# Patient Record
Sex: MALE | Race: WHITE
[De-identification: names, ages, dates, MRNs, and addresses within clinical notes are randomized per-mention and may not be internally consistent; named-entity substitution may affect disease eponyms.]

---

## 2019-11-10 ENCOUNTER — HOSPITAL ENCOUNTER (INPATIENT)
Dept: HOSPITAL 72 - EMR | Age: 74
LOS: 3 days | Discharge: ANOTHER HOSPITAL | DRG: 641 | End: 2019-11-13
Payer: MEDICARE

## 2019-11-10 VITALS — HEIGHT: 73 IN | BODY MASS INDEX: 24.68 KG/M2 | WEIGHT: 186.25 LBS

## 2019-11-10 VITALS — SYSTOLIC BLOOD PRESSURE: 108 MMHG | DIASTOLIC BLOOD PRESSURE: 58 MMHG

## 2019-11-10 VITALS — SYSTOLIC BLOOD PRESSURE: 111 MMHG | DIASTOLIC BLOOD PRESSURE: 56 MMHG

## 2019-11-10 VITALS — SYSTOLIC BLOOD PRESSURE: 120 MMHG | DIASTOLIC BLOOD PRESSURE: 71 MMHG

## 2019-11-10 DIAGNOSIS — E86.1: ICD-10-CM

## 2019-11-10 DIAGNOSIS — F32.9: ICD-10-CM

## 2019-11-10 DIAGNOSIS — Z79.82: ICD-10-CM

## 2019-11-10 DIAGNOSIS — I10: ICD-10-CM

## 2019-11-10 DIAGNOSIS — G20: ICD-10-CM

## 2019-11-10 DIAGNOSIS — F02.80: ICD-10-CM

## 2019-11-10 DIAGNOSIS — E78.5: ICD-10-CM

## 2019-11-10 DIAGNOSIS — E87.3: ICD-10-CM

## 2019-11-10 DIAGNOSIS — E86.0: Primary | ICD-10-CM

## 2019-11-10 DIAGNOSIS — R55: ICD-10-CM

## 2019-11-10 DIAGNOSIS — F20.0: ICD-10-CM

## 2019-11-10 DIAGNOSIS — K21.9: ICD-10-CM

## 2019-11-10 DIAGNOSIS — D64.9: ICD-10-CM

## 2019-11-10 LAB
ADD MANUAL DIFF: NO
ALBUMIN SERPL-MCNC: 3.3 G/DL (ref 3.4–5)
ALBUMIN/GLOB SERPL: 1.1 {RATIO} (ref 1–2.7)
ALP SERPL-CCNC: 66 U/L (ref 46–116)
ALT SERPL-CCNC: 12 U/L (ref 12–78)
AMYLASE SERPL-CCNC: 34 U/L (ref 25–115)
ANION GAP SERPL CALC-SCNC: 6 MMOL/L (ref 5–15)
APPEARANCE UR: (no result)
APTT PPP: YELLOW S
AST SERPL-CCNC: 19 U/L (ref 15–37)
BASOPHILS NFR BLD AUTO: 0.3 % (ref 0–2)
BILIRUB SERPL-MCNC: 0.9 MG/DL (ref 0.2–1)
BUN SERPL-MCNC: 15 MG/DL (ref 7–18)
CALCIUM SERPL-MCNC: 8.2 MG/DL (ref 8.5–10.1)
CHLORIDE SERPL-SCNC: 106 MMOL/L (ref 98–107)
CK MB SERPL-MCNC: 3.4 NG/ML (ref 0–3.6)
CK SERPL-CCNC: 125 U/L (ref 26–308)
CO2 SERPL-SCNC: 29 MMOL/L (ref 21–32)
CREAT SERPL-MCNC: 0.8 MG/DL (ref 0.55–1.3)
EOSINOPHIL NFR BLD AUTO: 0.4 % (ref 0–3)
ERYTHROCYTE [DISTWIDTH] IN BLOOD BY AUTOMATED COUNT: 11.8 % (ref 11.6–14.8)
GLOBULIN SER-MCNC: 2.9 G/DL
GLUCOSE UR STRIP-MCNC: NEGATIVE MG/DL
HCT VFR BLD CALC: 41.1 % (ref 42–52)
HGB BLD-MCNC: 13.8 G/DL (ref 14.2–18)
KETONES UR QL STRIP: (no result)
LEUKOCYTE ESTERASE UR QL STRIP: NEGATIVE
LYMPHOCYTES NFR BLD AUTO: 19.7 % (ref 20–45)
MCV RBC AUTO: 94 FL (ref 80–99)
MONOCYTES NFR BLD AUTO: 6.9 % (ref 1–10)
NEUTROPHILS NFR BLD AUTO: 72.8 % (ref 45–75)
NITRITE UR QL STRIP: NEGATIVE
PH UR STRIP: 6.5 [PH] (ref 4.5–8)
PHOSPHATE SERPL-MCNC: 4.4 MG/DL (ref 2.5–4.9)
PLATELET # BLD: 226 K/UL (ref 150–450)
POTASSIUM SERPL-SCNC: 4.1 MMOL/L (ref 3.5–5.1)
PROT UR QL STRIP: (no result)
RBC # BLD AUTO: 4.36 M/UL (ref 4.7–6.1)
SODIUM SERPL-SCNC: 141 MMOL/L (ref 136–145)
SP GR UR STRIP: 1.01 (ref 1–1.03)
UROBILINOGEN UR-MCNC: 4 MG/DL (ref 0–1)
WBC # BLD AUTO: 5.8 K/UL (ref 4.8–10.8)

## 2019-11-10 PROCEDURE — 85025 COMPLETE CBC W/AUTO DIFF WBC: CPT

## 2019-11-10 PROCEDURE — 83690 ASSAY OF LIPASE: CPT

## 2019-11-10 PROCEDURE — 99285 EMERGENCY DEPT VISIT HI MDM: CPT

## 2019-11-10 PROCEDURE — 83880 ASSAY OF NATRIURETIC PEPTIDE: CPT

## 2019-11-10 PROCEDURE — 96360 HYDRATION IV INFUSION INIT: CPT

## 2019-11-10 PROCEDURE — 93005 ELECTROCARDIOGRAM TRACING: CPT

## 2019-11-10 PROCEDURE — 84484 ASSAY OF TROPONIN QUANT: CPT

## 2019-11-10 PROCEDURE — 87040 BLOOD CULTURE FOR BACTERIA: CPT

## 2019-11-10 PROCEDURE — 82553 CREATINE MB FRACTION: CPT

## 2019-11-10 PROCEDURE — 90689 VACC IIV4 NO PRSRV 0.25ML IM: CPT

## 2019-11-10 PROCEDURE — 80053 COMPREHEN METABOLIC PANEL: CPT

## 2019-11-10 PROCEDURE — 81003 URINALYSIS AUTO W/O SCOPE: CPT

## 2019-11-10 PROCEDURE — 70450 CT HEAD/BRAIN W/O DYE: CPT

## 2019-11-10 PROCEDURE — 90732 PPSV23 VACC 2 YRS+ SUBQ/IM: CPT

## 2019-11-10 PROCEDURE — 83735 ASSAY OF MAGNESIUM: CPT

## 2019-11-10 PROCEDURE — 83605 ASSAY OF LACTIC ACID: CPT

## 2019-11-10 PROCEDURE — 87181 SC STD AGAR DILUTION PER AGT: CPT

## 2019-11-10 PROCEDURE — 82550 ASSAY OF CK (CPK): CPT

## 2019-11-10 PROCEDURE — 71045 X-RAY EXAM CHEST 1 VIEW: CPT

## 2019-11-10 PROCEDURE — 87081 CULTURE SCREEN ONLY: CPT

## 2019-11-10 PROCEDURE — 84100 ASSAY OF PHOSPHORUS: CPT

## 2019-11-10 PROCEDURE — 80048 BASIC METABOLIC PNL TOTAL CA: CPT

## 2019-11-10 PROCEDURE — 82150 ASSAY OF AMYLASE: CPT

## 2019-11-10 PROCEDURE — 36415 COLL VENOUS BLD VENIPUNCTURE: CPT

## 2019-11-10 PROCEDURE — 82962 GLUCOSE BLOOD TEST: CPT

## 2019-11-10 RX ADMIN — HEPARIN SODIUM SCH UNITS: 5000 INJECTION INTRAVENOUS; SUBCUTANEOUS at 20:49

## 2019-11-10 RX ADMIN — DEXTROSE AND SODIUM CHLORIDE SCH MLS/HR: 5; .45 INJECTION, SOLUTION INTRAVENOUS at 17:47

## 2019-11-10 RX ADMIN — ATORVASTATIN CALCIUM SCH MG: 20 TABLET, FILM COATED ORAL at 20:48

## 2019-11-10 NOTE — NUR
Pt [place] [facility]. Pt came with c/o of syncope episode witnessed 
by SNF staff earlier today. Pt AAO x3, vital signs stable, and respirations 
even and unlabored. No cardiac or respiratory distress noted. Patient placed on 
cardiac monitors

## 2019-11-10 NOTE — NUR
NURSE NOTES:

Patient was admitted from ED via gurney. AAO x 2 and confused. Room air. No s/s of 
distress/SOB. Stable vital sign 120/71,  96%. Sinus rhythm @ 85 on the heart monitor. IV on 
R hand 20g and L hand 18g intact and patent, with saline lock. Blanchable redness on 
buttocks and picture taken. Orientation on the new unit given. Belongings were accounted 
for. Bed in the lowest, locked, and alarm on. Call light within reach. Paged dr. Kerr for 
admission order and awaiting for the response. Will continue to monitor

## 2019-11-10 NOTE — EMERGENCY ROOM REPORT
History of Present Illness


General


Chief Complaint:  Syncope


Source:  Patient, Medical Record





Present Illness


HPI


Patient is 74-year-old male brought in by EMS after syncopal episode at his 

facility.  Patient had prior history of dementia and depression.  He was noted 

to have initially low blood pressure when checked by EMS.  He denies any prior 

cardiac history.  He denies prior syncopal episodes in the past.  History is 

limited by patient being a poor historian.


Allergies:  


Coded Allergies:  


     No Known Allergies (Unverified , 11/10/19)





Patient History


Past Medical History:  see triage record


Reviewed Nursing Documentation:  PMH: Agreed; PSxH: Agreed





Nursing Documentation-PMH


Hx Cardiac Problems:  No - Hyperlipidemia


Hx Hypertension:  Yes


History Of Psychiatric Problem:  Yes - Parkinson's, Dementia





Review of Systems


All Other Systems:  limited





Physical Exam





Vital Signs








  Date Time  Temp Pulse Resp B/P (MAP) Pulse Ox O2 Delivery O2 Flow Rate FiO2


 


11/10/19 12:21 98.1 66 15 112/56 (74) 99 Room Air  








Sp02 EP Interpretation:  reviewed, normal


General Appearance:  normal inspection, alert, Chronically Ill


Head:  atraumatic


ENT:  normal ENT inspection, normal voice, dry mucus membranes


Neck:  normal inspection, supple, no bony tend, limited range of motion


Respiratory:  normal inspection, no respiratory distress, no retraction, 

respiratory distress


Cardiovascular #1:  regular rate, rhythm, no edema


Gastrointestinal:  normal inspection, soft, no guarding, no hernia


Genitourinary:  no CVA tenderness


Musculoskeletal:  normal inspection, back normal, normal range of motion


Neurologic:  normal inspection, alert, responsive, speech normal, other - tremor


Psychiatric:  normal inspection, mood/affect normal





Medical Decision Making


Diagnostic Impression:  


 Primary Impression:  


 Syncope


 Additional Impression:  


 Head injury


ER Course


Patient presented for head injury after syncopal episode.  Differential 

diagnosis include was not limited to contusion, syncopal episode, seizure, 

sepsis among others.  Because of complexity of patient's case laboratory tests 

and imaging studies were ordered.  EKG interpreted by me showed normal sinus 

rhythm with a rate of 69 without acute ST or T wave changes.Patient's 

laboratory testing showed normal white blood count.  He was noted to have some 

difficulty with urination.Patient will be admitted for further monitoring and 

treatment due to recent syncopal episode and head injury.CT of head read by 

radiology showed no evidence of acute intracranial hemorrhage.Patient's 

laboratory testing was unremarkable.  Dr. Geraldo Kerr was contacted for 

inpatient management due to panel physician.





Labs








Test


  11/10/19


13:15 11/10/19


13:52


 


White Blood Count


  5.8 K/UL


(4.8-10.8) 


 


 


Red Blood Count


  4.36 M/UL


(4.70-6.10) 


 


 


Hemoglobin


  13.8 G/DL


(14.2-18.0) 


 


 


Hematocrit


  41.1 %


(42.0-52.0) 


 


 


Mean Corpuscular Volume 94 FL (80-99)  


 


Mean Corpuscular Hemoglobin


  31.6 PG


(27.0-31.0) 


 


 


Mean Corpuscular Hemoglobin


Concent 33.5 G/DL


(32.0-36.0) 


 


 


Red Cell Distribution Width


  11.8 %


(11.6-14.8) 


 


 


Platelet Count


  226 K/UL


(150-450) 


 


 


Mean Platelet Volume


  6.5 FL


(6.5-10.1) 


 


 


Neutrophils (%) (Auto)


  72.8 %


(45.0-75.0) 


 


 


Lymphocytes (%) (Auto)


  19.7 %


(20.0-45.0) 


 


 


Monocytes (%) (Auto)


  6.9 %


(1.0-10.0) 


 


 


Eosinophils (%) (Auto)


  0.4 %


(0.0-3.0) 


 


 


Basophils (%) (Auto)


  0.3 %


(0.0-2.0) 


 


 


Sodium Level


  141 MMOL/L


(136-145) 


 


 


Potassium Level


  4.1 MMOL/L


(3.5-5.1) 


 


 


Chloride Level


  106 MMOL/L


() 


 


 


Carbon Dioxide Level


  29 MMOL/L


(21-32) 


 


 


Anion Gap


  6 mmol/L


(5-15) 


 


 


Blood Urea Nitrogen


  15 mg/dL


(7-18) 


 


 


Creatinine


  0.8 MG/DL


(0.55-1.30) 


 


 


Estimat Glomerular Filtration


Rate  mL/min (>60) 


  


 


 


Glucose Level


  112 MG/DL


() 


 


 


Calcium Level


  8.2 MG/DL


(8.5-10.1) 


 


 


Troponin I


  0.000 ng/mL


(0.000-0.056) 


 








EKG Diagnostic Results


Rate:  normal - 69


Rhythm:  NSR


ST Segments:  no acute changes





Last Vital Signs








  Date Time  Temp Pulse Resp B/P (MAP) Pulse Ox O2 Delivery O2 Flow Rate FiO2


 


11/10/19 12:21 98.1 66 15 112/56 (74) 99 Room Air  








Status:  unchanged


Disposition:  ADMITTED AS INPATIENT


Condition:  Stable


Referrals:  


NON PHYSICIAN (PCP)











Ebenezer White MD Nov 10, 2019 13:58

## 2019-11-10 NOTE — DIAGNOSTIC IMAGING REPORT
Indication: Headache

 

Technique: Contiguous 5 mm thick transaxial imaging of the head obtained in a Siemens

Sensation 64 slice CT scanner.  Soft tissue and bone windows generated.  Automatic

Exposure Control was utilized.

 

 

Total Dose length Product (DLP):  1457 mGycm

 

CT Dose Index Volume (CTDIvol):   62.7 mGy

 

Comparison: none

 

Findings: There is mild prominence of the ventricles, basal cisterns, and cerebral

sulci consistent with atrophy. Mild, nonspecific, white matter hypoattenuation is

noted throughout the brain consistent with chronic small vessel disease. 

 

There is no midline shift, edema, acute hemorrhage, mass effect, or abnormal

extra-axial fluid collections. Bones are unremarkable.

 

Impression: No acute intracranial bleed, mass effect or edema.

 

Mild atrophy of the brain.

 

Nonspecific white matter hypoattenuation probably due to chronic small vessel

disease.

 

 

 

The CT scanner at [facility] is accredited by the American College of

Radiology and the scans are performed using dose optimization techniques as

appropriate to a performed exam including Automatic Exposure control.

## 2019-11-10 NOTE — NUR
NURSE NOTES:

 AAO x 2 confused. Room air. No s/s of distress/SOB.  IV on R hand 20g and L hand 18g intact 
and patent, saline lock. Blanchable redness on buttocks  Bed is in the lowest position, 
locked, and bed alarm on. Call light within reach. Asked pt to call when assistance is 
needed. Charting near patient's room as pt is very confused. Will continue to monitor and 
redirect.

## 2019-11-10 NOTE — DIAGNOSTIC IMAGING REPORT
Indication: Dyspnea

 

Comparison:  None

 

A single view chest radiograph was obtained.

 

Findings:

 

Cardiomediastinal appearance is within normal limits for age. The lungs are clear.

Pulmonary vascularity is appropriate. The diaphragmatic contour is smooth and

costophrenic angles are sharp. No pleural effusions are identified. The bones are

unremarkable. There is sclerosis and hypertrophy about the right glenohumeral joint

consistent with osteoarthritis.

 

Impression: No acute findings

## 2019-11-11 VITALS — SYSTOLIC BLOOD PRESSURE: 121 MMHG | DIASTOLIC BLOOD PRESSURE: 62 MMHG

## 2019-11-11 VITALS — SYSTOLIC BLOOD PRESSURE: 111 MMHG | DIASTOLIC BLOOD PRESSURE: 55 MMHG

## 2019-11-11 VITALS — SYSTOLIC BLOOD PRESSURE: 103 MMHG | DIASTOLIC BLOOD PRESSURE: 54 MMHG

## 2019-11-11 VITALS — SYSTOLIC BLOOD PRESSURE: 129 MMHG | DIASTOLIC BLOOD PRESSURE: 68 MMHG

## 2019-11-11 VITALS — SYSTOLIC BLOOD PRESSURE: 111 MMHG | DIASTOLIC BLOOD PRESSURE: 89 MMHG

## 2019-11-11 VITALS — DIASTOLIC BLOOD PRESSURE: 65 MMHG | SYSTOLIC BLOOD PRESSURE: 117 MMHG

## 2019-11-11 LAB
ADD MANUAL DIFF: NO
ANION GAP SERPL CALC-SCNC: 4 MMOL/L (ref 5–15)
BASOPHILS NFR BLD AUTO: 0.4 % (ref 0–2)
BUN SERPL-MCNC: 13 MG/DL (ref 7–18)
CALCIUM SERPL-MCNC: 8 MG/DL (ref 8.5–10.1)
CHLORIDE SERPL-SCNC: 107 MMOL/L (ref 98–107)
CO2 SERPL-SCNC: 28 MMOL/L (ref 21–32)
CREAT SERPL-MCNC: 0.9 MG/DL (ref 0.55–1.3)
EOSINOPHIL NFR BLD AUTO: 0.8 % (ref 0–3)
ERYTHROCYTE [DISTWIDTH] IN BLOOD BY AUTOMATED COUNT: 11.9 % (ref 11.6–14.8)
HCT VFR BLD CALC: 37.6 % (ref 42–52)
HGB BLD-MCNC: 12.6 G/DL (ref 14.2–18)
LYMPHOCYTES NFR BLD AUTO: 17.9 % (ref 20–45)
MCV RBC AUTO: 94 FL (ref 80–99)
MONOCYTES NFR BLD AUTO: 7.5 % (ref 1–10)
NEUTROPHILS NFR BLD AUTO: 73.5 % (ref 45–75)
PLATELET # BLD: 232 K/UL (ref 150–450)
POTASSIUM SERPL-SCNC: 4.3 MMOL/L (ref 3.5–5.1)
RBC # BLD AUTO: 4 M/UL (ref 4.7–6.1)
SODIUM SERPL-SCNC: 139 MMOL/L (ref 136–145)
WBC # BLD AUTO: 6.1 K/UL (ref 4.8–10.8)

## 2019-11-11 RX ADMIN — CARBIDOPA AND LEVODOPA SCH TAB: 25; 100 TABLET ORAL at 11:52

## 2019-11-11 RX ADMIN — DEXTROSE AND SODIUM CHLORIDE SCH MLS/HR: 5; .45 INJECTION, SOLUTION INTRAVENOUS at 09:59

## 2019-11-11 RX ADMIN — CARBIDOPA AND LEVODOPA SCH TAB: 25; 100 TABLET ORAL at 18:10

## 2019-11-11 RX ADMIN — HEPARIN SODIUM SCH UNITS: 5000 INJECTION INTRAVENOUS; SUBCUTANEOUS at 08:42

## 2019-11-11 RX ADMIN — DOCUSATE SODIUM SCH MG: 100 CAPSULE, LIQUID FILLED ORAL at 18:10

## 2019-11-11 RX ADMIN — HEPARIN SODIUM SCH UNITS: 5000 INJECTION INTRAVENOUS; SUBCUTANEOUS at 20:19

## 2019-11-11 RX ADMIN — PANTOPRAZOLE SODIUM SCH MG: 40 INJECTION, POWDER, FOR SOLUTION INTRAVENOUS at 08:41

## 2019-11-11 RX ADMIN — ASPIRIN SCH MG: 81 TABLET, DELAYED RELEASE ORAL at 08:41

## 2019-11-11 RX ADMIN — CITALOPRAM HYDROBROMIDE SCH MG: 20 TABLET, FILM COATED ORAL at 08:41

## 2019-11-11 RX ADMIN — DOCUSATE SODIUM SCH MG: 100 CAPSULE, LIQUID FILLED ORAL at 08:41

## 2019-11-11 RX ADMIN — CARBIDOPA AND LEVODOPA SCH TAB: 25; 100 TABLET ORAL at 06:33

## 2019-11-11 RX ADMIN — ATORVASTATIN CALCIUM SCH MG: 20 TABLET, FILM COATED ORAL at 20:17

## 2019-11-11 RX ADMIN — CARBIDOPA AND LEVODOPA SCH TAB: 25; 100 TABLET ORAL at 23:23

## 2019-11-11 RX ADMIN — CARBIDOPA AND LEVODOPA SCH TAB: 25; 100 TABLET ORAL at 00:14

## 2019-11-11 NOTE — CARDIOLOGY PROGRESS NOTE
Assessment/Plan


Assessment/Plan


The patient is seen and examined, full consult note will be dictated.





Objective





Last 24 Hour Vital Signs








  Date Time  Temp Pulse Resp B/P (MAP) Pulse Ox O2 Delivery O2 Flow Rate FiO2


 


11/11/19 16:00  82      


 


11/11/19 16:00 97.9 81 18 121/62 (81) 96   


 


11/11/19 12:00  90      


 


11/11/19 12:00 96.8 68 20 117/65 (82) 94   


 


11/11/19 08:42  87  129/68    


 


11/11/19 08:12      Room Air  


 


11/11/19 08:00 96.6 87 20 129/68 (88) 97   


 


11/11/19 08:00  85      


 


11/11/19 04:09  70      


 


11/11/19 04:00 98.1 94 16 111/55 (73) 94   


 


11/11/19 00:00 97.5 71 16 103/54 (70) 94   


 


11/11/19 00:00  76      


 


11/10/19 21:00      Room Air  


 


11/10/19 20:00 97.7 80 16 108/58 (75) 96   


 


11/10/19 20:00  79      

















Intake and Output  


 


 11/10/19 11/11/19





 18:59 06:59


 


Intake Total 1000 ml 0 ml


 


Output Total 1 ml 500 ml


 


Balance 999 ml -500 ml


 


  


 


Intake Oral 0 ml 0 ml


 


IV Total 1000 ml 


 


Output Urine Total 1 ml 500 ml


 


# Voids  3











Laboratory Tests








Test


  11/11/19


00:10 11/11/19


08:15


 


Troponin I


  0.007 ng/mL


(0.000-0.056) 0.000 ng/mL


(0.000-0.056)


 


White Blood Count


  


  6.1 K/UL


(4.8-10.8)


 


Red Blood Count


  


  4.00 M/UL


(4.70-6.10)  L


 


Hemoglobin


  


  12.6 G/DL


(14.2-18.0)  L


 


Hematocrit


  


  37.6 %


(42.0-52.0)  L


 


Mean Corpuscular Volume  94 FL (80-99)  


 


Mean Corpuscular Hemoglobin


  


  31.5 PG


(27.0-31.0)  H


 


Mean Corpuscular Hemoglobin


Concent 


  33.5 G/DL


(32.0-36.0)


 


Red Cell Distribution Width


  


  11.9 %


(11.6-14.8)


 


Platelet Count


  


  232 K/UL


(150-450)


 


Mean Platelet Volume


  


  6.0 FL


(6.5-10.1)  L


 


Neutrophils (%) (Auto)


  


  73.5 %


(45.0-75.0)


 


Lymphocytes (%) (Auto)


  


  17.9 %


(20.0-45.0)  L


 


Monocytes (%) (Auto)


  


  7.5 %


(1.0-10.0)


 


Eosinophils (%) (Auto)


  


  0.8 %


(0.0-3.0)


 


Basophils (%) (Auto)


  


  0.4 %


(0.0-2.0)


 


Sodium Level


  


  139 MMOL/L


(136-145)


 


Potassium Level


  


  4.3 MMOL/L


(3.5-5.1)


 


Chloride Level


  


  107 MMOL/L


()


 


Carbon Dioxide Level


  


  28 MMOL/L


(21-32)


 


Anion Gap


  


  4 mmol/L


(5-15)  L


 


Blood Urea Nitrogen


  


  13 mg/dL


(7-18)


 


Creatinine


  


  0.9 MG/DL


(0.55-1.30)


 


Estimat Glomerular Filtration


Rate 


   mL/min (>60)  


 


 


Glucose Level


  


  145 MG/DL


()  H


 


Calcium Level


  


  8.0 MG/DL


(8.5-10.1)  L

















Michael Harrison MD Nov 11, 2019 18:17

## 2019-11-11 NOTE — NUR
**: DISCHARGE PLANNING:



PATIENT HAS BEEN REFERRED TO [facility]

T: [phone]

F: [phone]



CLINICALS HAVE BEEN FAXED

WAITING FOR RESPONSE

## 2019-11-11 NOTE — NUR
NURSE NOTES:WOUND CARE NOTES:Pt presented on admission with non-blanching erythema sacrum 
without induration.

Thick callus plantar L foot. No erythema noted. Pt complained of pain to touch.  

Thick callus Plantar R foot. No erythema noted but pt complained of pain .

Both heels are dry and firm. No other skin concerns noted.



Tx.Plan: Apply Moisture Barrier Paste to buttocks. Cover sacrum with Optifoam drsg. Change 
every 3 days and prn.

            Apply Cavilon Skin Barrier to both heels. Cover each heel with Optifoam drsg. 
Change every 7 days and prn.

            Off-load heels with pillow.

            Reposition at least every 2hours or as tolerated.

## 2019-11-11 NOTE — NUR
NURSE NOTES:

Received pt in bed, AAO x 1, confused. Room air. No c/o of pain/distress. IV on R FA 20g 
intact and patent, running D5 1/2 NS @ 60 ml/hr. Condom cath on, draining pale yellow urine. 
Side rails x 2. Bed in the lowest, locked, and alarm on. Call light within reach. Will 
continue to monitor

## 2019-11-11 NOTE — NUR
**: DISCHARGE PLANNING:



PATIENT HAS BEEN ACCEPTED

TO [facility]

SPOKE TO PABLO IN ADMISSION TO CONFIRM 

T: [phone] FOR NURSE TO NURSE REPORT

F: [phone]



WAITING FOR DISCHARGE ORDER

## 2019-11-11 NOTE — NUR
PT EVALUATION NOTE

Patient seen for initial evaluation, see complete evaluation for details. Patient presents 
with generalized weakness and impaired balance which affects patient's ability to perform 
mobility tasks safely. Patient requires min/mod assist for bed mobility and transfers with 
FWW. Patient able to ambulate 80 ft with min assist and FWW, unsteady gait and patient 
distracted while ambulating making patient a high fall risk. Patient will benefit from 
skilled inpatient PT intervention to address strength, balance and safety to improve 
patient's level of functional mobility. Recommend discharge to ARU/SNF for further rehab 
once medically cleared by MD. Recommend FWW for increased safety with ambulation. 

-------------------------------------------------------------------------------

Addendum: 11/11/19 at 1257 by YOLIS CEBALLOS PT

-------------------------------------------------------------------------------

Amended: Links added.

## 2019-11-11 NOTE — NUR
NURSE NOTES:

Received report from ALYSON Eddy. Patient is in bed, awake and responsive. Breathing regular 
and unlabored with no SOB noted at this time. Patient's IV is intact, running fluids at 
prescribed rate. Bed is in lowest position, breaks engaged, bed-alarm on, and call light is 
within reach at all times. Will continue to monitor.

## 2019-11-11 NOTE — NUR
CASE MANAGEMENT: INITIAL REVIEW



74 YR OLD MALE BIBA FROM [email or web address]



CC:    SYNCOPE





SI:  SYNCOPE, HTN

98.0   66  15   112/56  99% ON RA





IS: IVF NS BOLUS X1

PT EVAL

CXR 

ECG



**2E TELE UNIT



DCP: REFER TO [facility]

## 2019-11-11 NOTE — HISTORY AND PHYSICAL REPORT
DATE OF ADMISSION:  11/10/2019

DATE AND TIME SEEN:  11/11/2019 at 10 a.m.



CONSULTANTS:

1. Michael Harrison M.D.

2. Mahendra Goldberg M.D.

3. Zion Hutchins M.D.

4. Brii Cruz M.D.



CHIEF COMPLAINT:  Weakness, syncope.



BRIEF HISTORY:  This is a 74-year-old male from assisted living facility

who was feeling weak.  He uses walker.  Apparently had a syncopal episode.

No chest pain.  Slight short of breath.  He came to [facility], diagnosed

the above, admitted to telemetry for further care.  Currently, calm in

bed, oriented x2, in no acute distress.



PAST MEDICAL HISTORY:  Nothing.



PAST SURGICAL HISTORY:  None.



ALLERGIES:  Denies.



SOCIAL HISTORY:  No smoking.  No alcohol.  No intravenous drug

abuse.



FAMILY HISTORY:  Noncontributory.



PHYSICAL EXAMINATION:

GENERAL:  Calm in bed, oriented x1, in no acute distress.

VITAL SIGNS:  Temperature is 96, pulse 87, respirations 20, blood pressure

129/68.

CARDIOVASCULAR:  No murmur.

LUNGS:  Distant and clear.

ABDOMEN:  Bowel sounds positive.  Nontender.  Nondistended.

EXTREMITIES:  No cyanosis, clubbing, edema.

NEUROLOGIC:  The patient moves all extremities, slightly weak.



LABORATORY AND DIAGNOSTIC DATA:  Labs at this time show hemoglobin and

hematocrit 12.6/37, otherwise CBC is normal.  BMP show glucose 145,

otherwise normal.  Calcium 8.0.  Troponin 0.007.  UA showed 1+ protein,

moderate squamous cells, 4+ blood.



MEDICATIONS:  Omeprazole, amlodipine, aspirin, citalopram, loratadine,

pantoprazole, carbidopa, atorvastatin, morphine.



ASSESSMENT:

1. Syncope.

2. Anemia.

3. Parkinson's.

4. Hypertension.

5. Possible UTI.



PLAN:

1. Antibiotic per Infectious Disease.

2. PT, OT, and dietary followup.

3. Troponin q.8 h. x3.

4. EKG in a.m.

5. Resume home medications.

6. CBC, BMP in the morning.









  ______________________________________________

  Geraldo Kerr D.O.





DR:  PATRICIA

D:  11/11/2019 10:07

T:  11/11/2019 15:51

JOB#:  [id number]

CC:

## 2019-11-12 VITALS — SYSTOLIC BLOOD PRESSURE: 118 MMHG | DIASTOLIC BLOOD PRESSURE: 66 MMHG

## 2019-11-12 VITALS — DIASTOLIC BLOOD PRESSURE: 67 MMHG | SYSTOLIC BLOOD PRESSURE: 115 MMHG

## 2019-11-12 VITALS — DIASTOLIC BLOOD PRESSURE: 66 MMHG | SYSTOLIC BLOOD PRESSURE: 121 MMHG

## 2019-11-12 VITALS — DIASTOLIC BLOOD PRESSURE: 77 MMHG | SYSTOLIC BLOOD PRESSURE: 124 MMHG

## 2019-11-12 VITALS — SYSTOLIC BLOOD PRESSURE: 116 MMHG | DIASTOLIC BLOOD PRESSURE: 83 MMHG

## 2019-11-12 VITALS — SYSTOLIC BLOOD PRESSURE: 116 MMHG | DIASTOLIC BLOOD PRESSURE: 88 MMHG

## 2019-11-12 LAB
ADD MANUAL DIFF: NO
ANION GAP SERPL CALC-SCNC: 8 MMOL/L (ref 5–15)
BASOPHILS NFR BLD AUTO: 0.3 % (ref 0–2)
BUN SERPL-MCNC: 15 MG/DL (ref 7–18)
CALCIUM SERPL-MCNC: 8.5 MG/DL (ref 8.5–10.1)
CHLORIDE SERPL-SCNC: 106 MMOL/L (ref 98–107)
CO2 SERPL-SCNC: 27 MMOL/L (ref 21–32)
CREAT SERPL-MCNC: 0.8 MG/DL (ref 0.55–1.3)
EOSINOPHIL NFR BLD AUTO: 1.1 % (ref 0–3)
ERYTHROCYTE [DISTWIDTH] IN BLOOD BY AUTOMATED COUNT: 11.9 % (ref 11.6–14.8)
HCT VFR BLD CALC: 37.8 % (ref 42–52)
HGB BLD-MCNC: 12.8 G/DL (ref 14.2–18)
LYMPHOCYTES NFR BLD AUTO: 18 % (ref 20–45)
MCV RBC AUTO: 93 FL (ref 80–99)
MONOCYTES NFR BLD AUTO: 8.6 % (ref 1–10)
NEUTROPHILS NFR BLD AUTO: 72 % (ref 45–75)
PLATELET # BLD: 237 K/UL (ref 150–450)
POTASSIUM SERPL-SCNC: 3.9 MMOL/L (ref 3.5–5.1)
RBC # BLD AUTO: 4.07 M/UL (ref 4.7–6.1)
SODIUM SERPL-SCNC: 140 MMOL/L (ref 136–145)
WBC # BLD AUTO: 6.3 K/UL (ref 4.8–10.8)

## 2019-11-12 RX ADMIN — DEXTROSE AND SODIUM CHLORIDE SCH MLS/HR: 5; .45 INJECTION, SOLUTION INTRAVENOUS at 02:57

## 2019-11-12 RX ADMIN — CARBIDOPA AND LEVODOPA SCH TAB: 25; 100 TABLET ORAL at 17:03

## 2019-11-12 RX ADMIN — DOCUSATE SODIUM SCH MG: 100 CAPSULE, LIQUID FILLED ORAL at 17:03

## 2019-11-12 RX ADMIN — CARBIDOPA AND LEVODOPA SCH TAB: 25; 100 TABLET ORAL at 05:53

## 2019-11-12 RX ADMIN — DOCUSATE SODIUM SCH MG: 100 CAPSULE, LIQUID FILLED ORAL at 10:04

## 2019-11-12 RX ADMIN — CARBIDOPA AND LEVODOPA SCH TAB: 25; 100 TABLET ORAL at 12:18

## 2019-11-12 RX ADMIN — HEPARIN SODIUM SCH UNITS: 5000 INJECTION INTRAVENOUS; SUBCUTANEOUS at 10:09

## 2019-11-12 RX ADMIN — ATORVASTATIN CALCIUM SCH MG: 20 TABLET, FILM COATED ORAL at 20:34

## 2019-11-12 RX ADMIN — DEXTROSE AND SODIUM CHLORIDE SCH MLS/HR: 5; .45 INJECTION, SOLUTION INTRAVENOUS at 17:04

## 2019-11-12 RX ADMIN — CITALOPRAM HYDROBROMIDE SCH MG: 20 TABLET, FILM COATED ORAL at 10:04

## 2019-11-12 RX ADMIN — ASPIRIN SCH MG: 81 TABLET, DELAYED RELEASE ORAL at 10:04

## 2019-11-12 RX ADMIN — HEPARIN SODIUM SCH UNITS: 5000 INJECTION INTRAVENOUS; SUBCUTANEOUS at 20:37

## 2019-11-12 RX ADMIN — PANTOPRAZOLE SODIUM SCH MG: 40 INJECTION, POWDER, FOR SOLUTION INTRAVENOUS at 10:04

## 2019-11-12 NOTE — GENERAL PROGRESS NOTE
Assessment/Plan


Problem List:  


(1) UTI (urinary tract infection)


ICD Codes:  N39.0 - Urinary tract infection, site not specified


SNOMED:  93302072


(2) Parkinson disease


ICD Codes:  G20 - Parkinson's disease


SNOMED:  77853875


(3) Anemia


ICD Codes:  D64.9 - Anemia, unspecified


SNOMED:  636205971


(4) HTN (hypertension)


ICD Codes:  I10 - Essential (primary) hypertension


SNOMED:  46070829


(5) Dehydration


ICD Codes:  E86.0 - Dehydration


SNOMED:  34922969


(6) Syncope


ICD Codes:  R55 - Syncope and collapse


SNOMED:  317539908


(7) Head injury


ICD Codes:  S09.90XA - Unspecified injury of head, initial encounter


SNOMED:  43176901


Status:  stable, progressing


Assessment/Plan:


pt diet wound care abx cbc bmp am aru transfer





Subjective


Constitutional:  Reports: weakness


Allergies:  


Coded Allergies:  


     No Known Allergies (Unverified , 11/10/19)


All Systems:  reviewed and negative except above


Subjective


calm in bed





Objective





Last 24 Hour Vital Signs








  Date Time  Temp Pulse Resp B/P (MAP) Pulse Ox O2 Delivery O2 Flow Rate FiO2


 


11/12/19 12:00  78      


 


11/12/19 11:23 98.6 74 20 115/67 (83) 98   


 


11/12/19 10:03  88  124/77    


 


11/12/19 08:00  91      


 


11/12/19 08:00      Room Air  


 


11/12/19 07:55 98.2 88 20 124/77 (93) 96   


 


11/12/19 04:00 97.4 73 18 118/66 (83) 95   


 


11/12/19 04:00  70      


 


11/12/19 00:00 98.1 86 17 116/83 (94) 96   


 


11/12/19 00:00  77      


 


11/11/19 21:00      Room Air  


 


11/11/19 20:00  82      


 


11/11/19 20:00 98.2 90 18 111/89 (96) 96   


 


11/11/19 16:00  82      


 


11/11/19 16:00 97.9 81 18 121/62 (81) 96   

















Intake and Output  


 


 11/11/19 11/12/19





 19:00 07:00


 


Intake Total 1540 ml 1450 ml


 


Output Total 2000 ml 800 ml


 


Balance -460 ml 650 ml


 


  


 


Intake Oral 1120 ml 850 ml


 


IV Total 420 ml 600 ml


 


Output Urine Total 2000 ml 800 ml








Laboratory Tests


11/12/19 07:09: 


White Blood Count 6.3, Red Blood Count 4.07L, Hemoglobin 12.8L, Hematocrit 37.8L

, Mean Corpuscular Volume 93, Mean Corpuscular Hemoglobin 31.5H, Mean 

Corpuscular Hemoglobin Concent 33.9, Red Cell Distribution Width 11.9, Platelet 

Count 237, Mean Platelet Volume 6.3L, Neutrophils (%) (Auto) 72.0, Lymphocytes (

%) (Auto) 18.0L, Monocytes (%) (Auto) 8.6, Eosinophils (%) (Auto) 1.1, 

Basophils (%) (Auto) 0.3, Sodium Level 140, Potassium Level 3.9, Chloride Level 

106, Carbon Dioxide Level 27, Anion Gap 8, Blood Urea Nitrogen 15, Creatinine 

0.8, Estimat Glomerular Filtration Rate , Glucose Level 100, Calcium Level 8.5


Height (Feet):  6


Height (Inches):  1.00


Weight (Pounds):  184


General Appearance:  lethargic


EENT:  normal ENT inspection


Neck:  normal alignment


Cardiovascular:  normal peripheral pulses, normal rate, regular rhythm


Respiratory/Chest:  chest wall non-tender, lungs clear, normal breath sounds


Abdomen:  normal bowel sounds, non tender, soft


Extremities:  normal inspection


Edema:  no edema noted Arm (L), no edema noted Arm (R), no edema noted Leg (L), 

no edema noted Leg (R), no edema noted Pedal (L), no edema noted Pedal (R), no 

edema noted Generalized


Neurologic:  motor weakness


Skin:  normal pigmentation, warm/dry











Geraldo Kerr DO Nov 12, 2019 14:45

## 2019-11-12 NOTE — INFECTIOUS DISEASES PROG NOTE
Assessment/Plan


Assessment/Plan





73yo gentleman with PMH below presents from nursing home [facility] for 

syncope. EMS noted low BP. ID consulted for possible UTI. Pt states that he 

currently feels well. Denies headache, cough, sob, abdominal pain, diarrhea. Pt 

states that he had dysuria but that is resolved. Never had flank pain. Spoke to 

nursing home staff. Pt supposedly had seizure, passed out and hit his head. No 

record of fever, dysuria, chills, diarrhea, coughing. No antibiotics given at 

nursing home. 





Afebrile


RA


No leukocytosis


No lactic acidosis





Unlikely UTI


   dysuria, resolved without antibiotics


   currently has condom catheter


   UA negative





GPC bacteremia, contaminant?


   11/10 bcx: GPC clusters


   11/12 Bcx: P


   no intravascular hardware





Unlikely PNA


   no cough, on RA


   11/10 CXR: no acute disaese


   


Syncope


   11/10 CTH: No acute intracranial bleed, mass effect or edema. Mild atrophy 

of the brain. Nonspecific white matter hypoattenuation probably due to chronic 

small vessel disease.


 





GERD


HTN


dyslipidemia


MDD


Parkinson


Dementia


nursing home resident


 





Plan:


monitor off antibiotics


f/u speciation of GPC in clusters...further workup and treatment will depend on 

speciation


f/u repeat bcx


aspiration precaution, elevate HOB, skin care, oral care





Thank you for this consult. [facility] will continue to follow the patient with 

you.





Subjective


Allergies:  


Coded Allergies:  


     No Known Allergies (Unverified , 11/10/19)


Subjective


Afebrile. Pt reports feeling good. 


Knows he is in the hospital but unsure why or which hospital.


No dysuria





Objective


Vital Signs





Last 24 Hour Vital Signs








  Date Time  Temp Pulse Resp B/P (MAP) Pulse Ox O2 Delivery O2 Flow Rate FiO2


 


11/12/19 10:03  88  124/77    


 


11/12/19 08:00  91      


 


11/12/19 08:00      Room Air  


 


11/12/19 07:55 98.2 88 20 124/77 (93) 96   


 


11/12/19 04:00 97.4 73 18 118/66 (83) 95   


 


11/12/19 04:00  70      


 


11/12/19 00:00 98.1 86 17 116/83 (94) 96   


 


11/12/19 00:00  77      


 


11/11/19 21:00      Room Air  


 


11/11/19 20:00  82      


 


11/11/19 20:00 98.2 90 18 111/89 (96) 96   


 


11/11/19 16:00  82      


 


11/11/19 16:00 97.9 81 18 121/62 (81) 96   


 


11/11/19 12:00  90      


 


11/11/19 12:00 96.8 68 20 117/65 (82) 94   








Height (Feet):  6


Height (Inches):  1.00


Weight (Pounds):  184


Objective





Gen: NAD


HEENT: anicteric sclera


CV: RRR


Resp: RRR. unlabored.


Abd: normoactive Bs+. Soft. no TTP


Back: no flank pain


Neuro: alert. does not know where he is or why he is in the hospital





Microbiology








 Date/Time


Source Procedure


Growth Status


 


 


 11/10/19 13:25


Blood Blood Culture - Preliminary


NO GROWTH AFTER 24 HOURS Resulted


 


 11/10/19 13:15


Blood Blood Culture - Preliminary Resulted


 


 11/10/19 15:40


Nasal Nares Left MRSA Culture - Final


NO METHICILLIN RESISTANT STAPH AUREUS... Complete


 


 11/10/19 15:40


Rectal Mucosa VRE Culture - Final


NO VANCOMYCIN RESISTANT ENTEROCOCCUS ... Complete











Laboratory Tests








Test


  11/12/19


07:09


 


White Blood Count


  6.3 K/UL


(4.8-10.8)


 


Red Blood Count


  4.07 M/UL


(4.70-6.10)  L


 


Hemoglobin


  12.8 G/DL


(14.2-18.0)  L


 


Hematocrit


  37.8 %


(42.0-52.0)  L


 


Mean Corpuscular Volume 93 FL (80-99)  


 


Mean Corpuscular Hemoglobin


  31.5 PG


(27.0-31.0)  H


 


Mean Corpuscular Hemoglobin


Concent 33.9 G/DL


(32.0-36.0)


 


Red Cell Distribution Width


  11.9 %


(11.6-14.8)


 


Platelet Count


  237 K/UL


(150-450)


 


Mean Platelet Volume


  6.3 FL


(6.5-10.1)  L


 


Neutrophils (%) (Auto)


  72.0 %


(45.0-75.0)


 


Lymphocytes (%) (Auto)


  18.0 %


(20.0-45.0)  L


 


Monocytes (%) (Auto)


  8.6 %


(1.0-10.0)


 


Eosinophils (%) (Auto)


  1.1 %


(0.0-3.0)


 


Basophils (%) (Auto)


  0.3 %


(0.0-2.0)


 


Sodium Level


  140 MMOL/L


(136-145)


 


Potassium Level


  3.9 MMOL/L


(3.5-5.1)


 


Chloride Level


  106 MMOL/L


()


 


Carbon Dioxide Level


  27 MMOL/L


(21-32)


 


Anion Gap


  8 mmol/L


(5-15)


 


Blood Urea Nitrogen


  15 mg/dL


(7-18)


 


Creatinine


  0.8 MG/DL


(0.55-1.30)


 


Estimat Glomerular Filtration


Rate  mL/min (>60)  


 


 


Glucose Level


  100 MG/DL


()


 


Calcium Level


  8.5 MG/DL


(8.5-10.1)











Current Medications








 Medications


  (Trade)  Dose


 Ordered  Sig/Chris


 Route


 PRN Reason  Start Time


 Stop Time Status Last Admin


Dose Admin


 


 Amlodipine


 Besylate


  (Norvasc)  5 mg  DAILY


 ORAL


   11/11/19 09:00


 12/11/19 08:59  11/12/19 10:03


 


 


 Aripiprazole


  (Abilify)  5 mg  QHS


 ORAL


   11/12/19 21:00


 12/11/19 20:59   


 


 


 Aspirin


  (Ecotrin)  81 mg  DAILY


 ORAL


   11/11/19 09:00


 12/11/19 08:59  11/12/19 10:04


 


 


 Atorvastatin


 Calcium


  (Lipitor)  20 mg  BEDTIME


 ORAL


   11/10/19 21:00


 12/10/19 20:59  11/11/19 20:17


 


 


 Carbidopa/Levodopa


  (Sinemet 25/100)  1 tab  EVERY 6  HOURS


 ORAL


   11/11/19 00:00


 12/11/19 00:00  11/12/19 05:53


 


 


 Citalopram


 Hydrobromide


  (celeXA)  10 mg  DAILY


 ORAL


   11/11/19 09:00


 12/11/19 08:59  11/12/19 10:04


 


 


 Dextrose/Sodium


 Chloride  1,000 ml @ 


 60 mls/hr  [id number]


 IV


   11/10/19 17:30


 12/10/19 17:29  11/12/19 02:57


 


 


 Docusate Sodium


  (Colace)  100 mg  TWICE A  DAY


 ORAL


   11/11/19 09:00


 12/11/19 08:59  11/12/19 10:04


 


 


 Heparin Sodium


  (Porcine)


  (Heparin 5000


 units/ml)  5,000 units  EVERY 12  HOURS


 SUBQ


   11/10/19 21:00


 12/10/19 20:59  11/12/19 10:09


 


 


 Loratadine


  (Claritin 10mg)  10 mg  DAILY


 ORAL


   11/11/19 09:00


 12/11/19 08:59  11/12/19 10:04


 


 


 Lorazepam


  (Ativan)  0.5 mg  Q6H  PRN


 ORAL


 For Anxiety  11/12/19 04:30


 11/19/19 04:29   


 


 


 Morphine Sulfate


  (Morphine


 Sulfate)  2 mg  Q4H  PRN


 IVP


 For Pain  11/10/19 16:45


 11/17/19 16:44   


 


 


 Pantoprazole


  (Protonix)  40 mg  DAILY


 IVP


   11/11/19 09:00


 12/11/19 08:59  11/12/19 10:04


 

















Antoinette Arnold MD Nov 12, 2019 11:24

## 2019-11-12 NOTE — NUR
NURSE NOTES:

Patient received from ALYSON Ramirez. Patient is awake and resting in bed, eyes open spontaneously. 
Patient is able to make needs known. Patient is on RA and remains free from cardiac or 
respiratory distress. Left FA 22g IV remains patent, asymptomatic and intact. D5 1/2 NS 
infusing at 60mL/hr. Patient has condom catheter in place. Condom catheter remains patent 
and draining to gravity, yellow urine noted in collection bag. Patient is able to ambulate 
but encouraged to only ambulate with assistance. Skin alteration noted stage 1 sacral 
pressure ulcer. Bed in lowest position, brakes on, bed alarm on, side rails up x3, and call 
light within reach. Will continue with plan of care.

## 2019-11-12 NOTE — NUR
NURSE NOTES:

recvd pt. Pt is awake and alert AOX2. Pt is on room air with no sign of sob or resp 
distress, breathing appears symmetrical and unlabored. Pt denies pain. Iv site is c/d/i and 
patent running D51/2NS @60/hr. Bed in lowest position, bed alarm is on, fall precautions in 
place, call light within reach, will continue with plan of care

## 2019-11-12 NOTE — NUR
NURSE NOTES:

Received pt and report from ALYSON Wade. Observed pt resting in bed with both eyes open. Pt 
is A/Ox1. IV site intact, asymptomatic, and patent; currently running D5 1/2 NS at 60cc/hr. 
Bed is in the lowest position and locked. Call light and beside table within reach. No 
signs/symptoms of acute distress noted at this time. Will continue plan of care.

## 2019-11-12 NOTE — NUR
HAND-OFF: 

Report given to ALYSON Godfrey. No signs/symptoms of acute distress noted at this time. Plan of 
care endorsed.

## 2019-11-12 NOTE — NUR
NURSE NOTES:

Dr Adler aware of gram + cocci clusters from blood cx, she does not want to d/c today, 
notified CHRISTIANO Crouch

## 2019-11-13 VITALS — SYSTOLIC BLOOD PRESSURE: 124 MMHG | DIASTOLIC BLOOD PRESSURE: 75 MMHG

## 2019-11-13 VITALS — DIASTOLIC BLOOD PRESSURE: 74 MMHG | SYSTOLIC BLOOD PRESSURE: 125 MMHG

## 2019-11-13 VITALS — SYSTOLIC BLOOD PRESSURE: 126 MMHG | DIASTOLIC BLOOD PRESSURE: 67 MMHG

## 2019-11-13 VITALS — SYSTOLIC BLOOD PRESSURE: 133 MMHG | DIASTOLIC BLOOD PRESSURE: 74 MMHG

## 2019-11-13 VITALS — SYSTOLIC BLOOD PRESSURE: 120 MMHG | DIASTOLIC BLOOD PRESSURE: 60 MMHG

## 2019-11-13 LAB
ADD MANUAL DIFF: NO
ANION GAP SERPL CALC-SCNC: 8 MMOL/L (ref 5–15)
BASOPHILS NFR BLD AUTO: 0.4 % (ref 0–2)
BUN SERPL-MCNC: 15 MG/DL (ref 7–18)
CALCIUM SERPL-MCNC: 8.5 MG/DL (ref 8.5–10.1)
CHLORIDE SERPL-SCNC: 107 MMOL/L (ref 98–107)
CO2 SERPL-SCNC: 26 MMOL/L (ref 21–32)
CREAT SERPL-MCNC: 0.7 MG/DL (ref 0.55–1.3)
EOSINOPHIL NFR BLD AUTO: 1.9 % (ref 0–3)
ERYTHROCYTE [DISTWIDTH] IN BLOOD BY AUTOMATED COUNT: 11.9 % (ref 11.6–14.8)
HCT VFR BLD CALC: 36.1 % (ref 42–52)
HGB BLD-MCNC: 12.4 G/DL (ref 14.2–18)
LYMPHOCYTES NFR BLD AUTO: 19.4 % (ref 20–45)
MCV RBC AUTO: 93 FL (ref 80–99)
MONOCYTES NFR BLD AUTO: 10.9 % (ref 1–10)
NEUTROPHILS NFR BLD AUTO: 67.3 % (ref 45–75)
PLATELET # BLD: 230 K/UL (ref 150–450)
POTASSIUM SERPL-SCNC: 3.8 MMOL/L (ref 3.5–5.1)
RBC # BLD AUTO: 3.87 M/UL (ref 4.7–6.1)
SODIUM SERPL-SCNC: 141 MMOL/L (ref 136–145)
WBC # BLD AUTO: 5.7 K/UL (ref 4.8–10.8)

## 2019-11-13 RX ADMIN — DOCUSATE SODIUM SCH MG: 100 CAPSULE, LIQUID FILLED ORAL at 17:29

## 2019-11-13 RX ADMIN — DOCUSATE SODIUM SCH MG: 100 CAPSULE, LIQUID FILLED ORAL at 09:41

## 2019-11-13 RX ADMIN — DEXTROSE AND SODIUM CHLORIDE SCH MLS/HR: 5; .45 INJECTION, SOLUTION INTRAVENOUS at 12:20

## 2019-11-13 RX ADMIN — CARBIDOPA AND LEVODOPA SCH TAB: 25; 100 TABLET ORAL at 17:30

## 2019-11-13 RX ADMIN — CITALOPRAM HYDROBROMIDE SCH MG: 20 TABLET, FILM COATED ORAL at 09:42

## 2019-11-13 RX ADMIN — CARBIDOPA AND LEVODOPA SCH TAB: 25; 100 TABLET ORAL at 12:20

## 2019-11-13 RX ADMIN — CARBIDOPA AND LEVODOPA SCH TAB: 25; 100 TABLET ORAL at 05:00

## 2019-11-13 RX ADMIN — PANTOPRAZOLE SODIUM SCH MG: 40 INJECTION, POWDER, FOR SOLUTION INTRAVENOUS at 09:42

## 2019-11-13 RX ADMIN — CARBIDOPA AND LEVODOPA SCH TAB: 25; 100 TABLET ORAL at 00:14

## 2019-11-13 RX ADMIN — ASPIRIN SCH MG: 81 TABLET, DELAYED RELEASE ORAL at 09:42

## 2019-11-13 RX ADMIN — HEPARIN SODIUM SCH UNITS: 5000 INJECTION INTRAVENOUS; SUBCUTANEOUS at 09:44

## 2019-11-13 NOTE — PROGRESS NOTE
DATE:  11/13/2019

SUBJECTIVE:  This is a 74-year-old male patient with syncope and

dehydration.  The patient continues to have some confusion, disorganized

thought process, and mood lability worsened by stress of his medical

illness.



This is a 74-year-old male patient who has weakness and syncope as well

as he is very disorganized, confused, and mood labile, but he also has

shortness of breath, was causing worsening of his mood lability as a

result.



MENTAL STATUS EXAMINATION:  This is a 74-year-old male.  Appearance is

disheveled.  Attitude, irritable and agitated.  Affect, guarded and

restricted.  Intellect, poor.  Mood, depressed and anxious.  Motor

activity, psychomotor agitation.  Attention span is poor.  Orientation x2.

Speech is pressured.  Thought process, disorganized and illogical.

Insight and judgment is poor.



DIAGNOSIS:  Paranoid schizophrenia, acute exacerbation; rule out dementia

with psychosis.



PLAN:  Plan for this patient is to treat him with a medication regimen

Abilify 5 mg nightly, Ativan 0.5 mg q.6 h. p.r.n. anxiety and agitation,

and Celexa 10 mg daily.  20 minutes of cognitive behavioral therapy

provided to help him identify his automatic negative thoughts and help him

convert his negative thoughts to more positive thoughts to reduce

depression, anxiety, and mood lability.  Chart reviewed.  Discussed with

staff.  Seen and assessed at bedside.









  ______________________________________________

  Brii Cruz M.D.





DR:  AMERICA

D:  11/13/2019 10:25

T:  11/13/2019 15:55

JOB#:  [id number]

CC:

## 2019-11-13 NOTE — NUR
NURSE NOTES:

Called Dr. Harrison's office regarding clearance. Left message. Awaiting for callback.

## 2019-11-13 NOTE — GENERAL PROGRESS NOTE
Assessment/Plan


Problem List:  


(1) UTI (urinary tract infection)


ICD Codes:  N39.0 - Urinary tract infection, site not specified


SNOMED:  24385153


(2) Parkinson disease


ICD Codes:  G20 - Parkinson's disease


SNOMED:  66576275


(3) Anemia


ICD Codes:  D64.9 - Anemia, unspecified


SNOMED:  258221698


(4) HTN (hypertension)


ICD Codes:  I10 - Essential (primary) hypertension


SNOMED:  70762440


(5) Dehydration


ICD Codes:  E86.0 - Dehydration


SNOMED:  43178100


(6) Syncope


ICD Codes:  R55 - Syncope and collapse


SNOMED:  046205298


(7) Head injury


ICD Codes:  S09.90XA - Unspecified injury of head, initial encounter


SNOMED:  72906170


Status:  stable, progressing


Assessment/Plan:


pt diet wound care abx cbc bmp am aru transfer





Subjective


Allergies:  


Coded Allergies:  


     No Known Allergies (Unverified , 11/10/19)


All Systems:  reviewed and negative except above


Subjective


walking w pt





Objective





Last 24 Hour Vital Signs








  Date Time  Temp Pulse Resp B/P (MAP) Pulse Ox O2 Delivery O2 Flow Rate FiO2


 


11/13/19 04:31  78      


 


11/13/19 04:00 98.1 75 20 124/75 (91) 96   


 


11/13/19 00:00 98.2 77 18 120/60 (80) 97   


 


11/12/19 22:00      Room Air  


 


11/12/19 21:00      Room Air  


 


11/12/19 20:00  76      


 


11/12/19 20:00 97.9 77 19 121/66 (84) 96   


 


11/12/19 16:00 98.2 86 20 116/88 (97) 96   


 


11/12/19 16:00  95      


 


11/12/19 12:00  78      


 


11/12/19 11:23 98.6 74 20 115/67 (83) 98   


 


11/12/19 10:03  88  124/77    

















Intake and Output  


 


 11/12/19 11/13/19





 19:00 07:00


 


Intake Total 740 ml 840 ml


 


Output Total 1700 ml 1200 ml


 


Balance -960 ml -360 ml


 


  


 


Intake Oral 740 ml 240 ml


 


IV Total  600 ml


 


Output Urine Total 1700 ml 1200 ml


 


# Bowel Movements 1 








Laboratory Tests


11/13/19 06:01: 


White Blood Count 5.7, Red Blood Count 3.87L, Hemoglobin 12.4L, Hematocrit 36.1L

, Mean Corpuscular Volume 93, Mean Corpuscular Hemoglobin 31.9H, Mean 

Corpuscular Hemoglobin Concent 34.3, Red Cell Distribution Width 11.9, Platelet 

Count 230, Mean Platelet Volume 6.7, Neutrophils (%) (Auto) 67.3, Lymphocytes (%

) (Auto) 19.4L, Monocytes (%) (Auto) 10.9H, Eosinophils (%) (Auto) 1.9, 

Basophils (%) (Auto) 0.4, Sodium Level 141, Potassium Level 3.8, Chloride Level 

107, Carbon Dioxide Level 26, Anion Gap 8, Blood Urea Nitrogen 15, Creatinine 

0.7, Estimat Glomerular Filtration Rate , Glucose Level 99, Calcium Level 8.5


Height (Feet):  6


Height (Inches):  1.00


Weight (Pounds):  186


General Appearance:  lethargic


EENT:  normal ENT inspection


Neck:  normal alignment


Cardiovascular:  normal peripheral pulses, normal rate, regular rhythm


Respiratory/Chest:  chest wall non-tender, lungs clear, normal breath sounds


Abdomen:  normal bowel sounds, non tender, soft


Extremities:  normal inspection


Edema:  no edema noted Arm (L), no edema noted Arm (R), no edema noted Leg (L), 

no edema noted Leg (R), no edema noted Pedal (L), no edema noted Pedal (R), no 

edema noted Generalized


Neurologic:  responsive, motor weakness


Skin:  normal pigmentation, warm/dry











Geraldo Kerr DO Nov 13, 2019 09:25

## 2019-11-13 NOTE — NUR
NURSE NOTES:  Patient in stable condition, /71, HR 87.  On room air, no signs of 
respiratory distress.  [facility] arrived and transported patient off unit.  Armband off.  Per 
[facility] staff, leave IV in.  Cardiac monitor off.

## 2019-11-13 NOTE — NUR
NURSE NOTES:

Received callback from Dr. Arnold. Per MD, patient not cleared from ID stand point d/t 
patient positive for blood culture one time and awaiting for 2nd result of blood culture. 
Dr. Kerr made aware of ID stand point, spoke , Willa, regard ID stand 
point.

## 2019-11-13 NOTE — NUR
CASE MANAGEMENT:  REVIEW



11/13/19



SI:  SYNCOPE, HTN

98.1   82  19   126/67  96% ON RA





IS:      IVF DEXTROSE @60ML/HR

HEPARIN SQ Q12HR

IV PROTONIX QD

CELEXA PO QD

ASPIRIN PO QD

NORVASC PO QD





**2E TELE UNIT



DCP: TO [facility]

## 2019-11-13 NOTE — CARDIOLOGY PROGRESS NOTE
Assessment/Plan


Assessment/Plan


1. Syncope, most likely due to hypovolemia. Continue hydration.


2. History of hypertension, continue amlodipine.


3. Parkinson disease.  


4. History of hyperlipidemia.


5. History of dementia.





Subjective


Subjective


Sinus rhythm at rate of 78.





Objective





Last 24 Hour Vital Signs








  Date Time  Temp Pulse Resp B/P (MAP) Pulse Ox O2 Delivery O2 Flow Rate FiO2


 


11/13/19 16:00  78      


 


11/13/19 16:00 98.2 79 21 133/74 (93) 95   


 


11/13/19 12:00  76      


 


11/13/19 12:00 98.6 70 21 125/74 (91) 96   


 


11/13/19 09:42  82  126/67    


 


11/13/19 09:00      Room Air  


 


11/13/19 08:00  85      


 


11/13/19 08:00 98.1 82 19 126/67 (86) 96   


 


11/13/19 04:31  78      


 


11/13/19 04:00 98.1 75 20 124/75 (91) 96   


 


11/13/19 00:00 98.2 77 18 120/60 (80) 97   

















Intake and Output  


 


 11/12/19 11/13/19





 18:59 06:59


 


Intake Total 740 ml 780 ml


 


Output Total 1700 ml 1200 ml


 


Balance -960 ml -420 ml


 


  


 


Intake Oral 740 ml 240 ml


 


IV Total  540 ml


 


Output Urine Total 1700 ml 1200 ml


 


# Bowel Movements 1 











Laboratory Tests








Test


  11/13/19


06:01


 


White Blood Count


  5.7 K/UL


(4.8-10.8)


 


Red Blood Count


  3.87 M/UL


(4.70-6.10)  L


 


Hemoglobin


  12.4 G/DL


(14.2-18.0)  L


 


Hematocrit


  36.1 %


(42.0-52.0)  L


 


Mean Corpuscular Volume 93 FL (80-99)  


 


Mean Corpuscular Hemoglobin


  31.9 PG


(27.0-31.0)  H


 


Mean Corpuscular Hemoglobin


Concent 34.3 G/DL


(32.0-36.0)


 


Red Cell Distribution Width


  11.9 %


(11.6-14.8)


 


Platelet Count


  230 K/UL


(150-450)


 


Mean Platelet Volume


  6.7 FL


(6.5-10.1)


 


Neutrophils (%) (Auto)


  67.3 %


(45.0-75.0)


 


Lymphocytes (%) (Auto)


  19.4 %


(20.0-45.0)  L


 


Monocytes (%) (Auto)


  10.9 %


(1.0-10.0)  H


 


Eosinophils (%) (Auto)


  1.9 %


(0.0-3.0)


 


Basophils (%) (Auto)


  0.4 %


(0.0-2.0)


 


Sodium Level


  141 MMOL/L


(136-145)


 


Potassium Level


  3.8 MMOL/L


(3.5-5.1)


 


Chloride Level


  107 MMOL/L


()


 


Carbon Dioxide Level


  26 MMOL/L


(21-32)


 


Anion Gap


  8 mmol/L


(5-15)


 


Blood Urea Nitrogen


  15 mg/dL


(7-18)


 


Creatinine


  0.7 MG/DL


(0.55-1.30)


 


Estimat Glomerular Filtration


Rate  mL/min (>60)  


 


 


Glucose Level


  99 MG/DL


()


 


Calcium Level


  8.5 MG/DL


(8.5-10.1)








Objective


HEENT:  Atraumatic and normocephalic.  Anicteric.  Pupils are equal, round,


and reactive to light and accommodation.  Extraocular muscles intact.  Dry


mucosal membrane.


NECK:  JVP less than 5 cm.  No carotid bruits.  Carotid upstroke is 2+


bilaterally.


LUNGS:  Clear to auscultation bilaterally.


ABDOMEN:  Soft, nontender, nondistended.  No hepatosplenomegaly.  Positive


bowel sounds.


EXTREMITIES:  No evidence of edema, clubbing, or cyanosis.











Michael Harrison MD Nov 13, 2019 23:57

## 2019-11-13 NOTE — NUR
**: DISCHARGE PLANNED:



PATIENT HAS BEEN ACCEPTED

TO [facility]

SPOKE TO IRMA IN ADMISSION TO CONFIRM 

T: [phone] FOR NURSE TO NURSE REPORT

F: [phone]

ROOM#[id number]



SPOKE TO TOM  FOR PATIENT TO INFORM OF DISCHARGE AND ADMISSION TO [facility]

T: [phone]

## 2019-11-13 NOTE — NUR
HAND-OFF: 

Report given to ALYSON Perdomo. Patient remains free from signs of cardiopulmonary distress. Bed in 
lowest position, brakes on, bed alarm activated, side rails up x3, and call light within 
reach. .

## 2019-11-13 NOTE — NUR
NURSE NOTES:

Patient provided with a bed bath, linen change and oral care. Patient tolerated care well. 
Patient remains sleeping in bed; bed is in the lowest position, safety wheels engaged, bed 
alarm activated, side rails up x3 and padded per protocol, call light within reach. Will 
continue to monitor.

## 2019-11-13 NOTE — NUR
NURSE NOTES:

Received report from ALYSON Godfrey. Patient in bed resting, no active s/s cardiac, respiratory 
distress noticed at this time. Patient on room air, SR with HR 78, AOx2, forgetful. IV on 
left FA 22G, asymptomatic, patent, intact, IV fluid running as prescribed rate. Bed in 
lowest position, side rails upx3, call light within reach, bed alarm on. Will continue to 
monitor.

## 2019-11-13 NOTE — NUR
**: DISCHARGE PLANNING:



PATIENT HAS BEEN ACCEPTED

TO [facility]

SPOKE TO PABLO IN ADMISSION TO CONFIRM 

T: [phone] FOR NURSE TO NURSE REPORT

F: [phone]



WAITING FOR DISCHARGE ORDER

## 2019-11-13 NOTE — INFECTIOUS DISEASES PROG NOTE
Assessment/Plan


Assessment/Plan





73yo gentleman with PMH below presents from nursing home [facility] for 

syncope. EMS noted low BP. ID consulted for possible UTI. Pt states that he 

currently feels well. Denies headache, cough, sob, abdominal pain, diarrhea. Pt 

states that he had dysuria but that is resolved. Never had flank pain. Spoke to 

nursing home staff. Pt supposedly had seizure, passed out and hit his head. No 

record of fever, dysuria, chills, diarrhea, coughing. No antibiotics given at 

nursing home. 





Afebrile


RA


No leukocytosis


No lactic acidosis





Unlikely UTI


   dysuria, resolved without antibiotics


   currently has condom catheter


   UA negative





GPC bacteremia, contaminant?


   11/10 bcx: GPC clusters


   11/12 Bcx: P


   no intravascular hardware





Unlikely PNA


   no cough, on RA


   11/10 CXR: no acute disaese


   


Syncope


   11/10 CTH: No acute intracranial bleed, mass effect or edema. Mild atrophy 

of the brain. Nonspecific white matter hypoattenuation probably due to chronic 

small vessel disease.


 





GERD


HTN


dyslipidemia


MDD


Parkinson


Dementia


nursing home resident


 





Plan:


monitor off antibiotics


f/u speciation of GPC in clusters...further workup and treatment will depend on 

speciation


f/u repeat bcx


aspiration precaution, elevate HOB, skin care, oral care





Thank you for this consult. [facility] will continue to follow the patient with 

you.





Subjective


Allergies:  


Coded Allergies:  


     No Known Allergies (Unverified , 11/10/19)


Subjective


Afebrile. Pt reports feeling good. 


No cough, sob, pain, dysuria, diarrhea, headache, chills.





Objective


Vital Signs





Last 24 Hour Vital Signs








  Date Time  Temp Pulse Resp B/P (MAP) Pulse Ox O2 Delivery O2 Flow Rate FiO2


 


11/13/19 09:42  82  126/67    


 


11/13/19 09:00      Room Air  


 


11/13/19 08:00  85      


 


11/13/19 08:00 98.1 82 19 126/67 (86) 96   


 


11/13/19 04:31  78      


 


11/13/19 04:00 98.1 75 20 124/75 (91) 96   


 


11/13/19 00:00 98.2 77 18 120/60 (80) 97   


 


11/12/19 22:00      Room Air  


 


11/12/19 21:00      Room Air  


 


11/12/19 20:00  76      


 


11/12/19 20:00 97.9 77 19 121/66 (84) 96   


 


11/12/19 16:00 98.2 86 20 116/88 (97) 96   


 


11/12/19 16:00  95      








Height (Feet):  6


Height (Inches):  1.00


Weight (Pounds):  186


Objective





Gen: NAD


HEENT: anicteric sclera


CV: RRR


Resp: RRR. unlabored.


Abd: normoactive Bs+. Soft. no TTP


Back: no flank pain


Neuro: alert. does not know where he is or why he is in the hospital





Microbiology








 Date/Time


Source Procedure


Growth Status


 


 


 11/10/19 15:40


Nasal Nares Left MRSA Culture - Final


NO METHICILLIN RESISTANT STAPH AUREUS... Complete


 


 11/10/19 15:40


Rectal Mucosa VRE Culture - Final


NO VANCOMYCIN RESISTANT ENTEROCOCCUS ... Complete











Laboratory Tests








Test


  11/13/19


06:01


 


White Blood Count


  5.7 K/UL


(4.8-10.8)


 


Red Blood Count


  3.87 M/UL


(4.70-6.10)  L


 


Hemoglobin


  12.4 G/DL


(14.2-18.0)  L


 


Hematocrit


  36.1 %


(42.0-52.0)  L


 


Mean Corpuscular Volume 93 FL (80-99)  


 


Mean Corpuscular Hemoglobin


  31.9 PG


(27.0-31.0)  H


 


Mean Corpuscular Hemoglobin


Concent 34.3 G/DL


(32.0-36.0)


 


Red Cell Distribution Width


  11.9 %


(11.6-14.8)


 


Platelet Count


  230 K/UL


(150-450)


 


Mean Platelet Volume


  6.7 FL


(6.5-10.1)


 


Neutrophils (%) (Auto)


  67.3 %


(45.0-75.0)


 


Lymphocytes (%) (Auto)


  19.4 %


(20.0-45.0)  L


 


Monocytes (%) (Auto)


  10.9 %


(1.0-10.0)  H


 


Eosinophils (%) (Auto)


  1.9 %


(0.0-3.0)


 


Basophils (%) (Auto)


  0.4 %


(0.0-2.0)


 


Sodium Level


  141 MMOL/L


(136-145)


 


Potassium Level


  3.8 MMOL/L


(3.5-5.1)


 


Chloride Level


  107 MMOL/L


()


 


Carbon Dioxide Level


  26 MMOL/L


(21-32)


 


Anion Gap


  8 mmol/L


(5-15)


 


Blood Urea Nitrogen


  15 mg/dL


(7-18)


 


Creatinine


  0.7 MG/DL


(0.55-1.30)


 


Estimat Glomerular Filtration


Rate  mL/min (>60)  


 


 


Glucose Level


  99 MG/DL


()


 


Calcium Level


  8.5 MG/DL


(8.5-10.1)











Current Medications








 Medications


  (Trade)  Dose


 Ordered  Sig/Chris


 Route


 PRN Reason  Start Time


 Stop Time Status Last Admin


Dose Admin


 


 Amlodipine


 Besylate


  (Norvasc)  5 mg  DAILY


 ORAL


   11/11/19 09:00


 12/11/19 08:59  11/13/19 09:42


 


 


 Aripiprazole


  (Abilify)  5 mg  QHS


 ORAL


   11/12/19 21:00


 12/11/19 20:59  11/12/19 20:34


 


 


 Aspirin


  (Ecotrin)  81 mg  DAILY


 ORAL


   11/11/19 09:00


 12/11/19 08:59  11/13/19 09:42


 


 


 Atorvastatin


 Calcium


  (Lipitor)  20 mg  BEDTIME


 ORAL


   11/10/19 21:00


 12/10/19 20:59  11/12/19 20:34


 


 


 Carbidopa/Levodopa


  (Sinemet 25/100)  1 tab  EVERY 6  HOURS


 ORAL


   11/11/19 00:00


 12/11/19 00:00  11/13/19 12:20


 


 


 Citalopram


 Hydrobromide


  (celeXA)  10 mg  DAILY


 ORAL


   11/11/19 09:00


 12/11/19 08:59  11/13/19 09:42


 


 


 Dextrose/Sodium


 Chloride  1,000 ml @ 


 60 mls/hr  [id number]


 IV


   11/10/19 17:30


 12/10/19 17:29  11/13/19 12:20


 


 


 Docusate Sodium


  (Colace)  100 mg  TWICE A  DAY


 ORAL


   11/11/19 09:00


 12/11/19 08:59  11/13/19 09:41


 


 


 Heparin Sodium


  (Porcine)


  (Heparin 5000


 units/ml)  5,000 units  EVERY 12  HOURS


 SUBQ


   11/10/19 21:00


 12/10/19 20:59  11/13/19 09:44


 


 


 Loratadine


  (Claritin 10mg)  10 mg  DAILY


 ORAL


   11/11/19 09:00


 12/11/19 08:59  11/13/19 09:42


 


 


 Lorazepam


  (Ativan)  0.5 mg  Q6H  PRN


 ORAL


 For Anxiety  11/12/19 04:30


 11/19/19 04:29   


 


 


 Morphine Sulfate


  (Morphine


 Sulfate)  2 mg  Q4H  PRN


 IVP


 For Pain  11/10/19 16:45


 11/17/19 16:44   


 


 


 Pantoprazole


  (Protonix)  40 mg  DAILY


 IVP


   11/11/19 09:00


 12/11/19 08:59  11/13/19 09:42


 

















Antoinette Arnold MD Nov 13, 2019 13:52

## 2019-11-13 NOTE — NUR
NURSE NOTES:

Called [facility] tele : [phone], the receiving nurse not available at this time. Will 
callback.

## 2019-11-13 NOTE — NUR
NURSE NOTES:

Per Dr. Harrison, patient cleared from cardiology stand point. Order noted, entered, carried 
out.

## 2019-11-13 NOTE — NUR
NURSE NOTES:

Paged Dr. Cruz, Dr. Harrison, Dr. Arnold for clearance per Dr. Kerr's discharge order. Per 
Dr. Cruz patient cleared from psychiatric stand point.

## 2019-11-14 NOTE — DISCHARGE SUMMARY
Discharge Summary


Discharge Summary


_


DATE OF ADMISSION: 11/10/2019





DATE OF DISCHARGE: 11/13/2019








DISCHARGED BY: Dr Kerr  





REASON FOR ADMISSION: 


74 years old male with past medical history of hypertension,  hyperlipidemia, 

Parkinson disease, dementia and depression, was brought by paramedics after he 

sustained  head injury secondary to syncopal episode at the facility.  


Initial blood pressure by paramedics was low.  


Patient denied any prior cardiac history. 


Patient denied prior syncopal episode in the past.


Upon evaluation in emergency department blood pressure was 112/56 , pulse 

oximetry was stable on room air.  


Laboratory work-up revealed no leukocytosis , hemoglobin 13.8,  hematocrit 

41.1.  Platelet count 226.


Stable electrolytes and renal parameters.  


Glucose 112.  


Troponin negative.  EKG revealed sinus rhythm,  no acute ischemic changes.  


Chest x-ray demonstrated no acute cardiopulmonary pathology.


CT of the head revealed no acute intracranial bleeding , mass-effect or edema.  


Mild atrophy of the brain and chronic small vessel disease noted .


Patient subsequently admitted to telemetry floor for further management.





CONSULTANTS:


cardiologist Dr. Harrison


neurologist Dr. Hutchins


ID specialist Dr. Arnold


psychiatrist Dr. Cruz


 


[facility] COURSE: 


Patient admitted to telemetry floor.  


Patient started on gentle IV hydration.  


Cardiologist and  neurologist followed.  


Telemetry continued to show sinus rhythm,  no evidence of arrhythmia.  


Serial troponin were negative.  EKG revealed no acute ischemic changes.  

Patient was ruled out for acute myocardial infarction. 


Per cardiology , syncope was  most likely due to hypovolemia.  


Patient showed some evidence of contraction alkalosis,  possibly explaining the 

intravascular volume contraction.  


No evidence of orthostatic changes.  


Blood pressure was closely monitored and remained stable with calcium channel 

blocker.  


Antiplatelet therapy therapy with aspirin and statin continued.


Neurologist followed.  


Per neurologist , patient had Parkinson disease with significant dementia. In 

any event per neurologist , patient probably had a syncopal episode due to 

decreased  oral intake and  dehydration.    GI prophylaxis provided.  


Sinemet continued. 





Psychiatrist followed.  


Per psychiatrist,  patient had   paranoid schizophrenia  with acute 

exacerbation.  


Psychiatric medication regimen was optimized as per psychiatrist.  


Cognitive behavioral therapy provided.





ID specialist followed .  


Urinalysis revealed no evidence of UTI.  


Patient initially exhibited dysuria , which resolved without antibiotic.  


Initial gram-positive bacteremia 1 out of 4 with Staph epidermidis on 11/10  

was likely contaminant as per ID specialist.  


Repeated blood cultures on 11/12 were negative.  


Patient had no cough.  Pulse oximetry was stable on room air . Chest x-ray 

revealed no acute cardiopulmonary pathology.


ID specialist recommended to keep patient off antibiotic and monitor closely.  


Aspiration precaution maintained.  





Hemoglobin and hematocrit were closely monitored with goal to keep hemoglobin 

above 7 .


Hemoglobin  and hematocrit remained at baseline ; prior to discharge hemoglobin 

12.4, hematocrit 36.1.


Supportive care provided.  


Fall precaution maintained. 


Patient was working  with physical therapist.  





Patient clinically stabilized and was ready for transfer to [facility] to acute rehabilitation unit for  rehabilitation.  











FINAL DIAGNOSES: 


Syncope , most likely due to hypovolemia/dehydration


Parkinson disease


Hypertension


Dyslipidemia


GERD


Dementia


Paranoid schizophrenia, acute exacerbation





 





DISCHARGE MEDICATIONS:


List of medication was sent to accepting facility 





DISCHARGE INSTRUCTIONS:


Patient was discharged to acute rehabilitation center at [facility]





I have been assigned to dictate discharge summary for this account.


I was not involved in the patient's management.











Kathleen Watts NP Nov 14, 2019 12:19

## 2021-08-19 NOTE — NUR
HAND-OFF: 

Report given to ALYSON Rangel. Anesthesia Volume In Cc: 0 Include Z78.9 (Other Specified Conditions Influencing Health Status) As An Associated Diagnosis?: No Medical Necessity Clause: This procedure was medically necessary because the lesions that were treated were: Post-Care Instructions: I reviewed with the patient in detail post-care instructions. Patient is to wear sunprotection, and avoid picking at any of the treated lesions. Pt may apply Vaseline to crusted or scabbing areas. Medical Necessity Information: It is in your best interest to select a reason for this procedure from the list below. All of these items fulfill various CMS LCD requirements except the new and changing color options. Detail Level: Simple Consent: The patient's consent was obtained including but not limited to risks of crusting, scabbing, blistering, scarring, darker or lighter pigmentary change, recurrence, incomplete removal and infection

## 2023-01-01 NOTE — CONSULTATION
Consult Note


Consult Note





HPI: 73yo gentleman with PMH below presents from nursing home [facility] for syncope. EMS noted low BP. ID consulted for possible UTI. Pt states 

that he currently feels well. Denies headache, cough, sob, abdominal pain, 

diarrhea. Pt states that he had dysuria but that is resolved. Never had flank 

pain. 





Spoke to nursing home staff. Pt supposedly had seizure, passed out and hit his 

head. No record of fever, dysuria, chills, diarrhea, coughing. No antibiotics 

given at nursing home. 





ROS: per HPI





PMH:


GERD


HTN


dyslipidemia


MDD


Parkinson


Dementia


nursing home resident





Meds: reviewed





NKDA





SHx: lives in nursing home





FHX: unable to obtain





VS: afebrile


Gen: NAD


HEENT: anicteric sclera


CV: RRR


Resp: RRR. unlabored.


Abd: normoactive Bs+. Soft. no TTP


Back: no flank pain


Neuro: alert. does not know where he is or why he is in the hospital





Labs: reviewed





Assessment:





Afebrile


RA


No leukocytosis


No lactic acidosis





Unlikely UTI


   dysuria, resolved without antibiotics


   currently has condom catheter


   UA negative





r/o bacteremia


   11/10 bcx: P





Unlikely PNA


   no cough, on RA


   11/10 CXR: no acute disaese


   


Syncope


   11/10 CTH: No acute intracranial bleed, mass effect or edema. Mild atrophy 

of the brain. Nonspecific white matter hypoattenuation probably due to chronic 

small vessel disease.


 





GERD


HTN


dyslipidemia


MDD


Parkinson


Dementia


nursing home resident


 





Plan:


monitor off antibiotics


f/u bcx


aspiration precaution, elevate HOB, skin care, oral care





Thank you for this consult. [facility] will continue to follow the patient with 

you.











Antoinette Arnold MD Nov 11, 2019 13:21
DATE OF CONSULTATION:  11/11/2019

CARDIOLOGY CONSULTATION



CONSULTING PHYSICIAN:  Michael Harrison M.D.



REFERRING PHYSICIAN:  Geraldo Kerr D.O.



REASON FOR CONSULTATION:  Management of syncope.



HISTORY OF PRESENT ILLNESS:  The patient is a very unfortunate 74-year-old

gentleman, who was brought in by EMS after a syncopal event.  It appears

that the patient may have been affected by dementia although he is

conversing with me at the bedside.  He claims that he comes from the

nursing home, but his loss of conscious occurred about 5 days ago.

According to the records from the emergency department, the patient was

noted to have low blood pressure when he was checked by EMS.  EMS was

called at request of the staff at the facility where the patient resides.

It is not clear whether the patient had a witnessed syncope or not.  At

the time of my evaluation, the patient denies any prior history of

coronary artery disease, cardiac arrhythmias, or congestive heart failure.

As mentioned above, the patient appears to be a poor historian due to

underlying dementia.



PAST MEDICAL HISTORY:

1. Parkinson disease.

2. Hypertension.

3. Dementia.

4. Hyperlipidemia.



ALLERGIES:  No known drug allergies.



FAMILY HISTORY:  No premature coronary artery disease in first-degree

relatives.



PAST SURGICAL HISTORY:  None.



REVIEW OF SYSTEMS:  HEENT:  Denies any headache, diplopia, or blurred

vision.  Positive for dizziness and lightheadedness.  CONSTITUTIONAL:

Positive for generalized weakness, but no fever, chills, or night sweats.

CARDIOVASCULAR:  Denies any chest pain, shortness breath, PND, orthopnea,

or leg swelling.  Positive for presyncope and syncope.  PULMONARY:  Denies

any cough, hemoptysis, or wheezing.  GASTROINTESTINAL:  Denies any nausea,

vomiting, diarrhea, constipation, abdominal pain, or GI bleed.

GENITOURINARY:  Denies any hematuria or dysuria.  Positive for

incontinence.  NEUROLOGIC:  Resting tremors of both hands.



MEDICATIONS:  List of medications reviewed and reconciled from the nursing

facility.



PHYSICAL EXAMINATION:

VITAL SIGNS:  Blood pressure was 112/56, pulse of 66, respirations of 15,

temperature 98.1 degrees Fahrenheit, and O2 saturation 99% on room air.

GENERAL:  The patient is a very pleasant 74-year-old gentleman, in no acute

respiratory distress.  Appears to be awake, conversing with me.

Orientation probably limited due to underlying dementia.

HEENT:  Atraumatic and normocephalic.  Anicteric.  Pupils are equal, round,

and reactive to light and accommodation.  Extraocular muscles intact.  Dry

mucosal membrane.

NECK:  JVP less than 5 cm.  No carotid bruits.  Carotid upstroke is 2+

bilaterally.

LUNGS:  Clear to auscultation bilaterally.

ABDOMEN:  Soft, nontender, nondistended.  No hepatosplenomegaly.  Positive

bowel sounds.

EXTREMITIES:  No evidence of edema, clubbing, or cyanosis.



LABORATORY FINDING:  WBC was 5.8, hemoglobin 13.8, hematocrit of 41.1%,

platelet count 226.  Sodium 141, potassium 4.1, chloride 106, bicarbonate

29, BUN of 15, creatinine 0.8, glucose 112, calcium 8.2.  Troponin I x3

negative.



A 12-lead electrocardiogram, sinus rhythm, at rate of 69 with no acute

ST and T wave abnormalities.



Chest x-ray showed no acute cardiopulmonary disease.



ASSESSMENT AND PLAN:  The patient is a very unfortunate 74-year-old

gentleman, seen in Cardiology consultation.



1. Syncope, most likely due to hypovolemia.  The patient shows some

evidence of contraction alkalosis, possibly explaining the intravascular

volume contraction.  I would consider hydrating the patient.  We will like

to obtain 2D echocardiography for assessment of LV systolic and diastolic

function.  Perhaps carotid artery ultrasound with assessment of vertebral

arteries to rule out vertebral insufficiency.  We will like to obtain

orthostatic vitals if the patient is stable overall standing up.  Neuro

evaluation is also warranted.  Further therapeutic and diagnostic decision

will be based on the results of the above tests.

2. History of hypertension.  We would like to place a hold on blood

pressure medication at this time.

3. Parkinson disease.  Orthostatic hypotension could be associated with

Parkinson disease and should be identified.  I would believe that

orthostatic vitals can help with ________.

4. History of hyperlipidemia.

5. History of dementia.



I would like to thank, Dr. Kerr, for the courtesy of this

consultation.









  ______________________________________________

  Michael Harrison M.D.





DR:  GE

D:  11/11/2019 19:34

T:  11/11/2019 21:57

JOB#:  [id number]

CC:
DATE OF CONSULTATION:  11/12/2019

INITIAL PSYCHIATRIC CONSULTATION



CONSULTING PHYSICIAN:  Brii Cruz M.D.



HISTORY OF PRESENT ILLNESS:  This is a 74-year-old male patient, who came

into the hospital at [facility] with weakness and syncope, but

he had a syncopal episode.  He is very confused, disorganized.  He has

altered mental status worsened by stress of his medical illness.  That is

why, his attending physician has requested daily psychiatric consultation.

He has shortness of breath, confusion, and history of mood lability.



MEDICAL HISTORY:  The patient has a medical history significant for head

injury, syncope, and dehydration.



ALLERGIES:  No known drug allergies.



PSYCHOTROPIC MEDICATIONS ON ADMISSION:  Per chart, he takes Abilify 5 at

bedtime and Celexa 10 mg daily.



SUBSTANCE ABUSE HISTORY:  He denies use of any drug or alcohol use.

 



FAMILY PSYCHIATRIC HISTORY:  Denies.



PAIN ASSESSMENT:  0/10.



DEVELOPMENT PROBLEMS:  Denies.



SOCIAL HISTORY:  This patient is living in a facility.  He is financially

supported by [employer] and Medicare



DIAGNOSES:

1. History of paranoid schizophrenia with acute exacerbation, rule out

dementia with psychosis.

2. Medical, dehydration.

3. Psychosocial stressors, financial.

4. Function impairment, severe.



PLAN:  Treat the patient with medication regimen Abilify 5 mg _____ and

Ativan 0.5 mg q.6 h. p.r.n. anxiety and agitation.  Chart reviewed.

Discussed with staff.  Seen and assessed at bedside.









  ______________________________________________

  Brii Cruz M.D.





DR:  JAYME/PATY

D:  11/12/2019 04:23

T:  11/13/2019 01:15

JOB#:  [id number]

CC:
DATE OF CONSULTATION:  11/13/2019

NOTE:  UNCLEAR AUDIO



CONSULTING PHYSICIAN:  Zion Hutchins M.D.



CHIEF COMPLAINT:  This 74-year-old right-handed white male with dementia

and Parkinson's like syndrome, possibly Lewy body disease was admitted

after a syncopal episode at his facility.  I cannot get a history out of

the patient because of significant memory loss.  He was at [facility].  He had a syncopal episode.  The paramedics came and

brought him to this hospital.  The patient denies any syncope in the past

or headaches or seizures.  He had Parkinson's for an unknown amount of

years.  Also has GERD, hypertension, hyperlipidemia, MDD.  His CBC on

admission revealed mild anemia with normochromic normocytic indices,

normal platelets, and white cell count.  His chemistries were normal

except for a blood sugar of 112 and a calcium of 8.2 and a total protein

of 6.2 with a low albumin.  Urinalysis reveal mild hematuria, +1 urine

ketones, a few bacteria.  Chest x-ray on admission is basically normal for

his age except for a right glenohumeral joint osteoarthritis.  His CT scan

revealed mild atrophy of the brain and nonspecific white matter

hypoattenuation probably due to small  vessel disease.  The patient

was placed on Norvasc on admission, Abilify 5 mg, atorvastatin 20 mg,

heparin subcutaneous.  He is on levodopa/carbidopa 25/100 one tablet q.6

hours.  He is also on Ativan 0.5 mg p.r.n. for anxiety and morphine, 

and Ecotrin 81 mg.  The patient had a psychiatric consultation by Dr. Cruz on 11/12/2019 with a diagnosis of history of paranoid

schizophrenia with acute exacerbation, rule out dementia with psychosis,

medical dehydration, psychosocial stressors, financial, functional

impairment was severe.  Abilify 5 mg and Ativan were recommended.  His

Infectious Disease consult revealed bacteremia, possibly contaminant.  He

was to be monitored off antibiotics.  Dr. Harrison, the cardiologist thought

his blood pressure medicine should be held and thought his syncope was due

to hypovolemia.  I was asked to see the patient in neurologic

consultation.



The patient totally cannot give a history.  No other history otherwise

was reviewed.



PAST MEDICAL HISTORY/PAST MEDICAL ILLNESSES:  See above.



ALLERGIES:  No known allergies.



SURGERIES:  Denies any surgeries.



REVIEW OF SYSTEMS:  Not available.



PHYSICAL EXAMINATION:

GENERAL:  He is a well developed, well nourished disheveled appearing male,

lying in bed.

VITAL SIGNS:  His blood pressure is 126/65; pulse rate is 82, regular;

respiratory rate is 19.

HEENT:  Examination of head, ears, eyes, nose, mouth, and throat revealed

dry mucous membranes.

NECK:  .  Carotids could not be felt.  There are no bruits

appreciated.

LUNGS:  Decreased breath sounds bilaterally.  His lungs are clear.

CARDIOVASCULAR:  PMI could not be felt.  JVP could not be seen.  The

patient's heart tones are distant.  I could not hear any obvious murmurs

or rubs.

ABDOMEN:  The abdomen is slightly obese.  Bowel sounds were intact.  There

is no tenderness elicited or organomegaly.

BACK:  There is no tenderness to percussion.

EXTREMITIES:  The patient has flexion contractures of the right fingers.

NEUROLOGIC:

MENTAL STATUS:  He is alert and awake.  Judgment was impaired.  Affect was

appropriate.  His mood, he is in a rather good mood.  Memory, past memory

was intact to his birthday, but not his mother's maiden name.  He did not

know the name of the place where he lived.  His immediate recall is 3/3

objects.  Recent recall is 0/3 objects at 3 minutes.  Intellect,

similarities could not be done.  Orientation, time, he thought the date

was 11/12/2012 or 11/13/2012.  Place, he [place] was [place] [employer]  
Person,

he is oriented to person.  Language function, spoken speech was fluent.

There were no paraphasias.  Comprehension, simple repetitions were intact.

He could not spell world forwards or backwards, especially backwards.



CRANIAL NERVE EXAMINATION:

CRANIAL NERVE II:  Visual fields are intact to confrontation.  Fundi were

not visualized.

CRANIAL NERVES III, IV, AND VI:  Extraocular motility was full with flaccid

smooth pursuit.  The pupils are approximately 3 mm, round.

CRANIAL NERVE V:  Corneal sensation appeared to be intact.  He had a

vertical jaw tremor noted about 2 to 3 cycles per second.

CRANIAL NERVE VII:  Facial strength appeared to be symmetrical.

CRANIAL NERVE VIII:  Auditory acuity was at least partially intact

bilaterally to low voice.

CRANIAL NERVES IX AND X:  Gag was decreased bilaterally.

CRANIAL NERVE XI:  Sternocleidomastoid mastoid strength was 5/5.

CRANIAL NERVE XII:  Tongue protrudes in the midline without fasciculations

or atrophy.

MUSCLE EXAMINATION:  Muscle tone revealed some paratonia in the right upper

extremity, increased tone and rigidity in the left lower extremity

compared to the right.  He had 4 cycles/second tremor in both hands at

rest, which decreased with intention.  Strength is 5/5in the upper  and 5/5 in

the right lower extremity.  There is some atrophy noted  index

finger on his right palm.  Proximal muscle strength is 5/5.  Reflexes were

trace in the upper extremities, +1 in the knees, 0 at the ankles with

downgoing toes on testing for Babinski response.

COORDINATION:  Finger-nose-finger could not be done.



GAIT AND STATION:  Could not be tested.

SENSORY:  Sensory:  Sensory examination .



IMPRESSION:  The patient has parkinsons disease with significant dementia, 
which could

be Lewy body disease.    In any event, the

patient probably had a syncopal episode due to decreased intake and/or

dehydration.  He has Parkinson disease.  He may have an element of

autonomic neuropathy.  The patient's fluid intake should be increased.

There is no evidence of any seizure, therefore,anticonvulsants not indicated.



PLAN:

1. Workup for anemia.

2. improve fluid intake



Thank you for this interesting case, Dr. Kerr.









  ______________________________________________

  Zion Hutchins MD





DR:  MACO

D:  11/13/2019 12:43

T:  11/14/2019 03:10

JOB#:  [id number]

CC:



ADEBAYO
done